# Patient Record
Sex: MALE | URBAN - METROPOLITAN AREA
[De-identification: names, ages, dates, MRNs, and addresses within clinical notes are randomized per-mention and may not be internally consistent; named-entity substitution may affect disease eponyms.]

---

## 2023-11-09 ENCOUNTER — TELEPHONE ENCOUNTER (OUTPATIENT)
Dept: URBAN - METROPOLITAN AREA CLINIC 88 | Facility: CLINIC | Age: 28
End: 2023-11-09

## 2023-11-16 ENCOUNTER — LAB OUTSIDE AN ENCOUNTER (OUTPATIENT)
Dept: URBAN - METROPOLITAN AREA CLINIC 88 | Facility: CLINIC | Age: 28
End: 2023-11-16

## 2023-11-16 ENCOUNTER — OFFICE VISIT (OUTPATIENT)
Dept: URBAN - METROPOLITAN AREA CLINIC 88 | Facility: CLINIC | Age: 28
End: 2023-11-16
Payer: COMMERCIAL

## 2023-11-16 VITALS
TEMPERATURE: 97 F | OXYGEN SATURATION: 99 % | DIASTOLIC BLOOD PRESSURE: 71 MMHG | HEIGHT: 68 IN | SYSTOLIC BLOOD PRESSURE: 127 MMHG | WEIGHT: 170.8 LBS | HEART RATE: 83 BPM | BODY MASS INDEX: 25.88 KG/M2

## 2023-11-16 DIAGNOSIS — R14.0 BLOATING: ICD-10-CM

## 2023-11-16 DIAGNOSIS — R19.8 ALTERNATING CONSTIPATION AND DIARRHEA: ICD-10-CM

## 2023-11-16 DIAGNOSIS — R10.30 LOWER ABDOMINAL PAIN: ICD-10-CM

## 2023-11-16 DIAGNOSIS — Z86.59 HISTORY OF ANXIETY: ICD-10-CM

## 2023-11-16 DIAGNOSIS — K21.9 GASTROESOPHAGEAL REFLUX DISEASE, UNSPECIFIED WHETHER ESOPHAGITIS PRESENT: ICD-10-CM

## 2023-11-16 PROBLEM — 235595009: Status: ACTIVE | Noted: 2023-11-16

## 2023-11-16 PROBLEM — 161470009: Status: ACTIVE | Noted: 2023-11-16

## 2023-11-16 PROCEDURE — 99204 OFFICE O/P NEW MOD 45 MIN: CPT | Performed by: NURSE PRACTITIONER

## 2023-11-16 RX ORDER — VORTIOXETINE 20 MG/1
1 TABLET TABLET, FILM COATED ORAL ONCE A DAY
Status: ACTIVE | COMMUNITY

## 2023-11-16 RX ORDER — FINASTERIDE 1 MG/1
1 TABLET TABLET, FILM COATED ORAL ONCE A DAY
Status: ACTIVE | COMMUNITY

## 2023-11-16 RX ORDER — BUPROPION HYDROCHLORIDE 300 MG/1
1 TABLET IN THE MORNING TABLET, EXTENDED RELEASE ORAL ONCE A DAY
Status: ACTIVE | COMMUNITY

## 2023-11-16 RX ORDER — METHYLPHENIDATE 20 MG/1
1 CAPSULE IN THE MORNING CAPSULE, EXTENDED RELEASE ORAL ONCE A DAY
Status: ACTIVE | COMMUNITY

## 2023-11-16 RX ORDER — FAMOTIDINE 40 MG/1
1 TABLET AT BEDTIME TABLET, FILM COATED ORAL ONCE A DAY
Qty: 90 TABLET | Refills: 0

## 2023-11-16 RX ORDER — GABAPENTIN 300 MG/1
1 CAPSULE CAPSULE ORAL ONCE A DAY
Status: ACTIVE | COMMUNITY

## 2023-11-16 RX ORDER — BACLOFEN 10 MG/1
1 TABLET AS NEEDED TABLET ORAL TWICE A DAY
Status: ACTIVE | COMMUNITY

## 2023-11-16 RX ORDER — OMEPRAZOLE 40 MG/1
1 CAPSULE 30 MINUTES BEFORE MORNING MEAL CAPSULE, DELAYED RELEASE ORAL ONCE A DAY
Status: ACTIVE | COMMUNITY

## 2023-11-16 RX ORDER — FLUTICASONE PROPIONATE 93 UG/1
2 SPRAYS (1 SPRAY IN EACH NOSTRIL) SPRAY, METERED NASAL TWICE A DAY
Status: ACTIVE | COMMUNITY

## 2023-11-16 RX ORDER — FAMOTIDINE 40 MG/1
1 TABLET AT BEDTIME TABLET, FILM COATED ORAL ONCE A DAY
Status: ACTIVE | COMMUNITY

## 2023-11-16 RX ORDER — OMEPRAZOLE 40 MG/1
TAKE 1 CAPSULE CAPSULE, DELAYED RELEASE ORAL ONCE A DAY
Qty: 90 | Refills: 1

## 2023-11-16 RX ORDER — DICYCLOMINE HYDROCHLORIDE 20 MG/1
1 TABLET TABLET ORAL THREE TIMES A DAY
Status: ACTIVE | COMMUNITY

## 2023-11-16 NOTE — HPI-TODAY'S VISIT:
Patient presents today to mi a colonoscopy. Developed several GI issues after Covid in May 2021 that has included increase in reflux, abdominal pain and varying stool consistencies.  Was evaluated by gastroenterologist while living in in [place] who recommended colonoscopy be perofrmed.  States moved back to [place] before work-up could be complete.  Voices a long hx of reflux and alternating bowel habits.   Experiences lower abdominal pain (left usually more than right) and lower back pain.  Describes his pain as being sense of bloating/fullness to a dull, ache occuring intermittently.  Feels his pain increases with certain foods (diary, ripe bananas, garlic, onions, etc).  Has been adhering to Low FODMAP diet for several years with improvement in abdominal discomfort voiced with use.  Dicyclomine has become less effective (10-20mg/dose) over the years.   Stool consistency varies from types 4-5 or 5-6 on [place] Stool Scale.  Voices hx of constipation during his childhood.   Typically defecation occurs multiple times a day (up 3-4 times) but may not achieve a complete sense of evacuation of stool.  Over the years, has tried Miralax, Benefiber.  Voices traumatic experiences with defecation as a child that he feels has contributed to his alternating bowel habits.  Feels his abdominal discomfort improves with defecation.   Denies signs of GI blood loss, weight loss or fever/chills. Has a hx of anxiety and is currently controlled with current regimen.   Last EGD was a year ago in [place], [place] (Dr. DELON Soto) with reflux voiced.  Currently on omeprazole and famotidine with adequate control symptoms. Colonoscopy was performed 2 years ago in [facility] with poor prep voiced.

## 2023-11-17 ENCOUNTER — OFFICE VISIT (OUTPATIENT)
Dept: URBAN - METROPOLITAN AREA CLINIC 88 | Facility: CLINIC | Age: 28
End: 2023-11-17

## 2023-11-20 ENCOUNTER — TELEPHONE ENCOUNTER (OUTPATIENT)
Dept: URBAN - METROPOLITAN AREA CLINIC 88 | Facility: CLINIC | Age: 28
End: 2023-11-20

## 2023-11-22 ENCOUNTER — OFFICE VISIT (OUTPATIENT)
Dept: URBAN - METROPOLITAN AREA SURGERY CENTER 24 | Facility: SURGERY CENTER | Age: 28
End: 2023-11-22

## 2023-11-22 ENCOUNTER — CLAIMS CREATED FROM THE CLAIM WINDOW (OUTPATIENT)
Dept: URBAN - METROPOLITAN AREA SURGERY CENTER 24 | Facility: SURGERY CENTER | Age: 28
End: 2023-11-22
Payer: COMMERCIAL

## 2023-11-22 DIAGNOSIS — R10.30 LOWER ABDOMINAL PAIN: ICD-10-CM

## 2023-11-22 DIAGNOSIS — R19.4 ALTERATION IN BOWEL ELIMINATION: ICD-10-CM

## 2023-11-22 DIAGNOSIS — R19.4 CHANGE IN BOWEL HABITS: ICD-10-CM

## 2023-11-22 PROCEDURE — 00811 ANES LWR INTST NDSC NOS: CPT | Performed by: NURSE ANESTHETIST, CERTIFIED REGISTERED

## 2023-11-22 PROCEDURE — G8907 PT DOC NO EVENTS ON DISCHARG: HCPCS | Performed by: INTERNAL MEDICINE

## 2023-11-22 PROCEDURE — 45378 DIAGNOSTIC COLONOSCOPY: CPT | Performed by: INTERNAL MEDICINE

## 2023-11-22 RX ORDER — BACLOFEN 10 MG/1
1 TABLET AS NEEDED TABLET ORAL TWICE A DAY
Status: ACTIVE | COMMUNITY

## 2023-11-22 RX ORDER — DICYCLOMINE HYDROCHLORIDE 20 MG/1
1 TABLET TABLET ORAL THREE TIMES A DAY
Status: ACTIVE | COMMUNITY

## 2023-11-22 RX ORDER — GABAPENTIN 300 MG/1
1 CAPSULE CAPSULE ORAL ONCE A DAY
Status: ACTIVE | COMMUNITY

## 2023-11-22 RX ORDER — FAMOTIDINE 40 MG/1
1 TABLET AT BEDTIME TABLET, FILM COATED ORAL ONCE A DAY
Qty: 90 TABLET | Refills: 0 | Status: ACTIVE | COMMUNITY

## 2023-11-22 RX ORDER — OMEPRAZOLE 40 MG/1
TAKE 1 CAPSULE CAPSULE, DELAYED RELEASE ORAL ONCE A DAY
Qty: 90 | Refills: 1 | Status: ACTIVE | COMMUNITY

## 2023-11-22 RX ORDER — FLUTICASONE PROPIONATE 93 UG/1
2 SPRAYS (1 SPRAY IN EACH NOSTRIL) SPRAY, METERED NASAL TWICE A DAY
Status: ACTIVE | COMMUNITY

## 2023-11-22 RX ORDER — FINASTERIDE 1 MG/1
1 TABLET TABLET, FILM COATED ORAL ONCE A DAY
Status: ACTIVE | COMMUNITY

## 2023-11-22 RX ORDER — BUPROPION HYDROCHLORIDE 300 MG/1
1 TABLET IN THE MORNING TABLET, EXTENDED RELEASE ORAL ONCE A DAY
Status: ACTIVE | COMMUNITY

## 2023-11-22 RX ORDER — VORTIOXETINE 20 MG/1
1 TABLET TABLET, FILM COATED ORAL ONCE A DAY
Status: ACTIVE | COMMUNITY

## 2023-11-22 RX ORDER — METHYLPHENIDATE 20 MG/1
1 CAPSULE IN THE MORNING CAPSULE, EXTENDED RELEASE ORAL ONCE A DAY
Status: ACTIVE | COMMUNITY

## 2023-12-18 PROBLEM — 440544005: Status: ACTIVE | Noted: 2023-12-18

## 2023-12-19 ENCOUNTER — OFFICE VISIT (OUTPATIENT)
Dept: URBAN - METROPOLITAN AREA CLINIC 88 | Facility: CLINIC | Age: 28
End: 2023-12-19
Payer: COMMERCIAL

## 2023-12-19 VITALS
HEART RATE: 102 BPM | DIASTOLIC BLOOD PRESSURE: 81 MMHG | WEIGHT: 165 LBS | SYSTOLIC BLOOD PRESSURE: 128 MMHG | BODY MASS INDEX: 25.01 KG/M2 | HEIGHT: 68 IN | OXYGEN SATURATION: 100 % | TEMPERATURE: 97.8 F

## 2023-12-19 DIAGNOSIS — K21.9 GASTROESOPHAGEAL REFLUX DISEASE, UNSPECIFIED WHETHER ESOPHAGITIS PRESENT: ICD-10-CM

## 2023-12-19 DIAGNOSIS — Z86.59 HISTORY OF ANXIETY: ICD-10-CM

## 2023-12-19 DIAGNOSIS — K58.2 MIXED IRRITABLE BOWEL SYNDROME: ICD-10-CM

## 2023-12-19 PROCEDURE — 99214 OFFICE O/P EST MOD 30 MIN: CPT | Performed by: NURSE PRACTITIONER

## 2023-12-19 RX ORDER — DICYCLOMINE HYDROCHLORIDE 20 MG/1
1 TABLET TABLET ORAL THREE TIMES A DAY
Status: ACTIVE | COMMUNITY

## 2023-12-19 RX ORDER — GABAPENTIN 300 MG/1
1 CAPSULE CAPSULE ORAL ONCE A DAY
Status: ACTIVE | COMMUNITY

## 2023-12-19 RX ORDER — FLUTICASONE PROPIONATE 93 UG/1
2 SPRAYS (1 SPRAY IN EACH NOSTRIL) SPRAY, METERED NASAL TWICE A DAY
Status: ACTIVE | COMMUNITY

## 2023-12-19 RX ORDER — FINASTERIDE 1 MG/1
1 TABLET TABLET, FILM COATED ORAL ONCE A DAY
Status: ACTIVE | COMMUNITY

## 2023-12-19 RX ORDER — METHYLPHENIDATE 20 MG/1
1 CAPSULE IN THE MORNING CAPSULE, EXTENDED RELEASE ORAL ONCE A DAY
Status: ACTIVE | COMMUNITY

## 2023-12-19 RX ORDER — POLYETHYLENE GLYCOL 3350 17 G/DOSE
MIX ONE SCOOP IN AT LEAST 8 OZ OF WATER, TEA, COFFEE, JUICE POWDER (GRAM) ORAL
Qty: 510 GRAMS | Refills: 5 | OUTPATIENT
Start: 2023-12-19 | End: 2024-06-16

## 2023-12-19 RX ORDER — FAMOTIDINE 40 MG/1
1 TABLET AT BEDTIME TABLET, FILM COATED ORAL ONCE A DAY
Qty: 90 TABLET | Refills: 0

## 2023-12-19 RX ORDER — BACLOFEN 10 MG/1
1 TABLET AS NEEDED TABLET ORAL TWICE A DAY
Status: ACTIVE | COMMUNITY

## 2023-12-19 RX ORDER — OMEPRAZOLE 40 MG/1
TAKE 1 CAPSULE CAPSULE, DELAYED RELEASE ORAL ONCE A DAY
Qty: 90 | Refills: 1 | Status: ACTIVE | COMMUNITY

## 2023-12-19 RX ORDER — BUPROPION HYDROCHLORIDE 300 MG/1
1 TABLET IN THE MORNING TABLET, EXTENDED RELEASE ORAL ONCE A DAY
Status: ACTIVE | COMMUNITY

## 2023-12-19 RX ORDER — OMEPRAZOLE 40 MG/1
TAKE 1 CAPSULE CAPSULE, DELAYED RELEASE ORAL ONCE A DAY
Qty: 90 | Refills: 1

## 2023-12-19 RX ORDER — VORTIOXETINE 20 MG/1
1 TABLET TABLET, FILM COATED ORAL ONCE A DAY
Status: ACTIVE | COMMUNITY

## 2023-12-19 RX ORDER — FAMOTIDINE 40 MG/1
1 TABLET AT BEDTIME TABLET, FILM COATED ORAL ONCE A DAY
Qty: 90 TABLET | Refills: 0 | Status: ACTIVE | COMMUNITY

## 2023-12-19 NOTE — HPI-TODAY'S VISIT:
Presents today for follow up after undergoing colonoscopy on 11/22/2023 for evaluation of c/o abdominal pain and alternating bowel pattern.  Colonoscopy was normal.  KUB on 11/16/2023 revealed moderate amount of gas and stool within rectal vault with rectum distended to 7 cm.  Continues to voice excessive gas/bloating, intestinal spasms (especially with anxiety) and alternating bowel habits.  Typically defecation occurs 3-5 times a day.  May not experience a complete sense of evacuation of stool   Currently on Miralax once a day with occasional fiber supplements.  Instructed to increase Miralax to 34 grams/day but is hesistant to increase dose.  Remains on Low FODMAP diet with minimal improvement in complaints.      Last EGD was a year ago in [place], [place] (Dr. DELON Soto) with reflux voiced.  Currently on omeprazole and famotidine with adequate control symptoms. Colonoscopy was performed 2 years ago in [facility] with poor prep voiced.

## 2023-12-19 NOTE — HPI-OTHER HISTORIES
-------------------------------------------------------------------- Last office note 11/16/2023: Patient presents today to mi a colonoscopy. Developed several GI issues after Covid in May 2021 that has included increase in reflux, abdominal pain and varying stool consistencies.  Was evaluated by gastroenterologist while living in in [place] who recommended colonoscopy be perofrmed.  States moved back to [place] before work-up could be complete.  Voices a long hx of reflux and alternating bowel habits.   Experiences lower abdominal pain (left usually more than right) and lower back pain.  Describes his pain as being sense of bloating/fullness to a dull, ache occuring intermittently.  Feels his pain increases with certain foods (diary, ripe bananas, garlic, onions, etc).  Has been adhering to Low FODMAP diet for several years with improvement in abdominal discomfort voiced with use.  Dicyclomine has become less effective (10-20mg/dose) over the years.   Stool consistency varies from types 4-5 or 5-6 on [place] Stool Scale.  Voices hx of constipation during his childhood.   Typically defecation occurs multiple times a day (up 3-4 times) but may not achieve a complete sense of evacuation of stool.  Over the years, has tried Miralax, Benefiber.  Voices traumatic experiences with defecation as a child that he feels has contributed to his alternating bowel habits.  Feels his abdominal discomfort improves with defecation.   Denies signs of GI blood loss, weight loss or fever/chills. Has a hx of anxiety and is currently controlled with current regimen.   Last EGD was a year ago in [place], [place] (Dr. DELON Soto) with reflux voiced.  Currently on omeprazole and famotidine with adequate control symptoms. Colonoscopy was performed 2 years ago in [facility] with poor prep voiced.

## 2024-01-30 ENCOUNTER — OFFICE VISIT (OUTPATIENT)
Dept: URBAN - METROPOLITAN AREA CLINIC 88 | Facility: CLINIC | Age: 29
End: 2024-01-30
Payer: COMMERCIAL

## 2024-01-30 VITALS
BODY MASS INDEX: 24.61 KG/M2 | SYSTOLIC BLOOD PRESSURE: 124 MMHG | TEMPERATURE: 97.2 F | DIASTOLIC BLOOD PRESSURE: 74 MMHG | WEIGHT: 162.4 LBS | HEIGHT: 68 IN | HEART RATE: 72 BPM | OXYGEN SATURATION: 99 %

## 2024-01-30 DIAGNOSIS — K58.2 MIXED IRRITABLE BOWEL SYNDROME: ICD-10-CM

## 2024-01-30 DIAGNOSIS — K21.9 GASTROESOPHAGEAL REFLUX DISEASE, UNSPECIFIED WHETHER ESOPHAGITIS PRESENT: ICD-10-CM

## 2024-01-30 DIAGNOSIS — Z86.59 HISTORY OF ANXIETY: ICD-10-CM

## 2024-01-30 PROCEDURE — 99214 OFFICE O/P EST MOD 30 MIN: CPT | Performed by: NURSE PRACTITIONER

## 2024-01-30 RX ORDER — FAMOTIDINE 40 MG/1
1 TABLET AT BEDTIME TABLET, FILM COATED ORAL ONCE A DAY
Qty: 90 TABLET | Refills: 0

## 2024-01-30 RX ORDER — BUPROPION HYDROCHLORIDE 300 MG/1
1 TABLET IN THE MORNING TABLET, EXTENDED RELEASE ORAL ONCE A DAY
Status: ACTIVE | COMMUNITY

## 2024-01-30 RX ORDER — VORTIOXETINE 20 MG/1
1 TABLET TABLET, FILM COATED ORAL ONCE A DAY
Status: ACTIVE | COMMUNITY

## 2024-01-30 RX ORDER — FAMOTIDINE 40 MG/1
1 TABLET AT BEDTIME TABLET, FILM COATED ORAL ONCE A DAY
Qty: 90 TABLET | Refills: 0 | Status: ACTIVE | COMMUNITY

## 2024-01-30 RX ORDER — OMEPRAZOLE 40 MG/1
TAKE 1 CAPSULE CAPSULE, DELAYED RELEASE ORAL ONCE A DAY
Qty: 90 | Refills: 1 | Status: ACTIVE | COMMUNITY

## 2024-01-30 RX ORDER — OMEPRAZOLE 40 MG/1
TAKE 1 CAPSULE CAPSULE, DELAYED RELEASE ORAL ONCE A DAY
Qty: 90 | Refills: 1

## 2024-01-30 RX ORDER — POLYETHYLENE GLYCOL 3350 17 G/DOSE
MIX ONE SCOOP IN AT LEAST 8 OZ OF WATER, TEA, COFFEE, JUICE POWDER (GRAM) ORAL
Qty: 510 GRAMS | Refills: 5 | OUTPATIENT

## 2024-01-30 RX ORDER — GABAPENTIN 300 MG/1
1 CAPSULE CAPSULE ORAL ONCE A DAY
Status: ACTIVE | COMMUNITY

## 2024-01-30 RX ORDER — METHYLPHENIDATE 20 MG/1
1 CAPSULE IN THE MORNING CAPSULE, EXTENDED RELEASE ORAL ONCE A DAY
Status: ACTIVE | COMMUNITY

## 2024-01-30 RX ORDER — POLYETHYLENE GLYCOL 3350 17 G/DOSE
MIX ONE SCOOP IN AT LEAST 8 OZ OF WATER, TEA, COFFEE, JUICE POWDER (GRAM) ORAL
Qty: 510 GRAMS | Refills: 5 | Status: ACTIVE | COMMUNITY
Start: 2023-12-19 | End: 2024-06-16

## 2024-01-30 RX ORDER — FINASTERIDE 1 MG/1
1 TABLET TABLET, FILM COATED ORAL ONCE A DAY
Status: ACTIVE | COMMUNITY

## 2024-01-30 RX ORDER — BACLOFEN 10 MG/1
1 TABLET AS NEEDED TABLET ORAL TWICE A DAY
Status: ACTIVE | COMMUNITY

## 2024-01-30 RX ORDER — DICYCLOMINE HYDROCHLORIDE 20 MG/1
1 TABLET TABLET ORAL THREE TIMES A DAY
Status: ACTIVE | COMMUNITY

## 2024-01-30 RX ORDER — FLUTICASONE PROPIONATE 93 UG/1
2 SPRAYS (1 SPRAY IN EACH NOSTRIL) SPRAY, METERED NASAL TWICE A DAY
Status: ACTIVE | COMMUNITY

## 2024-01-30 NOTE — HPI-OTHER HISTORIES
-------------------------------------------------------------------- Last office note 12/19/2023: Presents today for follow up after undergoing colonoscopy on 11/22/2023 for evaluation of c/o abdominal pain and alternating bowel pattern. Colonoscopy was normal. KUB on 11/16/2023 revealed moderate amount of gas and stool within rectal vault with rectum distended to 7 cm.  Continues to voice excessive gas/bloating, intestinal spasms (especially with anxiety) and alternating bowel habits.  Typically defecation occurs 3-5 times a day.  May not experience a complete sense of evacuation of stool   Currently on Miralax once a day with occasional fiber supplements.  Instructed to increase Miralax to 34 grams/day but is hesistant to increase dose.  Remains on Low FODMAP diet with minimal improvement in complaints.      Last EGD was a year ago in [place], [place] (Dr. DELON Soto) with reflux voiced.  Currently on omeprazole and famotidine with adequate control symptoms. Colonoscopy was performed 2 years ago in [place] [place]. [facility]"" with poor prep voiced.

## 2024-01-30 NOTE — HPI-TODAY'S VISIT:
Patient with hx of IBS presetning for follow up.  Remains on Mirlaax daily to manage constipation.  Fails to experience a complete sense of evacuation of stool.  Continues to voice excessive gas/bloating, intestinal spasms (especially with anxiety) and alternating bowel habits.  Typically defecation occurs 3-5 times a day. Remains on Low FODMAP diet.

## 2024-02-07 PROBLEM — 440630006: Status: ACTIVE | Noted: 2024-02-07

## 2024-04-30 ENCOUNTER — OV EP (OUTPATIENT)
Dept: URBAN - METROPOLITAN AREA CLINIC 88 | Facility: CLINIC | Age: 29
End: 2024-04-30

## 2024-04-30 RX ORDER — BUPROPION HYDROCHLORIDE 300 MG/1
1 TABLET IN THE MORNING TABLET, EXTENDED RELEASE ORAL ONCE A DAY
Status: ACTIVE | COMMUNITY

## 2024-04-30 RX ORDER — PLECANATIDE 3 MG/1
1 TABLET TABLET ORAL
Qty: 90 | Refills: 3 | Status: ACTIVE | COMMUNITY
Start: 2024-02-07 | End: 2025-02-01

## 2024-04-30 RX ORDER — FLUTICASONE PROPIONATE 93 UG/1
2 SPRAYS (1 SPRAY IN EACH NOSTRIL) SPRAY, METERED NASAL TWICE A DAY
Status: ACTIVE | COMMUNITY

## 2024-04-30 RX ORDER — METHYLPHENIDATE 20 MG/1
1 CAPSULE IN THE MORNING CAPSULE, EXTENDED RELEASE ORAL ONCE A DAY
Status: ACTIVE | COMMUNITY

## 2024-04-30 RX ORDER — FINASTERIDE 1 MG/1
1 TABLET TABLET, FILM COATED ORAL ONCE A DAY
Status: ACTIVE | COMMUNITY

## 2024-04-30 RX ORDER — DICYCLOMINE HYDROCHLORIDE 20 MG/1
1 TABLET TABLET ORAL THREE TIMES A DAY
Status: ACTIVE | COMMUNITY

## 2024-04-30 RX ORDER — BACLOFEN 10 MG/1
1 TABLET AS NEEDED TABLET ORAL TWICE A DAY
Status: ACTIVE | COMMUNITY

## 2024-04-30 RX ORDER — FAMOTIDINE 40 MG/1
1 TABLET AT BEDTIME TABLET, FILM COATED ORAL ONCE A DAY
Qty: 90 TABLET | Refills: 0 | Status: ACTIVE | COMMUNITY

## 2024-04-30 RX ORDER — GABAPENTIN 300 MG/1
1 CAPSULE CAPSULE ORAL ONCE A DAY
Status: ACTIVE | COMMUNITY

## 2024-04-30 RX ORDER — POLYETHYLENE GLYCOL 3350 17 G/DOSE
MIX ONE SCOOP IN AT LEAST 8 OZ OF WATER, TEA, COFFEE, JUICE POWDER (GRAM) ORAL
Qty: 510 GRAMS | Refills: 5 | Status: ACTIVE | COMMUNITY

## 2024-04-30 RX ORDER — OMEPRAZOLE 40 MG/1
TAKE 1 CAPSULE CAPSULE, DELAYED RELEASE ORAL ONCE A DAY
Qty: 90 | Refills: 1 | Status: ACTIVE | COMMUNITY

## 2024-04-30 RX ORDER — VORTIOXETINE 20 MG/1
1 TABLET TABLET, FILM COATED ORAL ONCE A DAY
Status: ACTIVE | COMMUNITY

## 2024-05-15 ENCOUNTER — LAB OUTSIDE AN ENCOUNTER (OUTPATIENT)
Dept: URBAN - METROPOLITAN AREA CLINIC 88 | Facility: CLINIC | Age: 29
End: 2024-05-15

## 2024-05-15 ENCOUNTER — DASHBOARD ENCOUNTERS (OUTPATIENT)
Age: 29
End: 2024-05-15

## 2024-05-15 ENCOUNTER — OFFICE VISIT (OUTPATIENT)
Dept: URBAN - METROPOLITAN AREA CLINIC 88 | Facility: CLINIC | Age: 29
End: 2024-05-15
Payer: COMMERCIAL

## 2024-05-15 VITALS
BODY MASS INDEX: 24.43 KG/M2 | DIASTOLIC BLOOD PRESSURE: 73 MMHG | SYSTOLIC BLOOD PRESSURE: 117 MMHG | OXYGEN SATURATION: 100 % | WEIGHT: 161.2 LBS | TEMPERATURE: 98 F | HEART RATE: 88 BPM | HEIGHT: 68 IN

## 2024-05-15 DIAGNOSIS — R14.0 POSTPRANDIAL BLOATING: ICD-10-CM

## 2024-05-15 DIAGNOSIS — K21.9 GASTROESOPHAGEAL REFLUX DISEASE, UNSPECIFIED WHETHER ESOPHAGITIS PRESENT: ICD-10-CM

## 2024-05-15 DIAGNOSIS — Z86.59 HISTORY OF ANXIETY: ICD-10-CM

## 2024-05-15 DIAGNOSIS — K58.1 IRRITABLE BOWEL SYNDROME WITH CONSTIPATION: ICD-10-CM

## 2024-05-15 PROCEDURE — 99213 OFFICE O/P EST LOW 20 MIN: CPT | Performed by: NURSE PRACTITIONER

## 2024-05-15 RX ORDER — GABAPENTIN 300 MG/1
1 CAPSULE CAPSULE ORAL ONCE A DAY
Status: ACTIVE | COMMUNITY

## 2024-05-15 RX ORDER — PLECANATIDE 3 MG/1
1 TABLET TABLET ORAL
OUTPATIENT
Start: 2024-02-07

## 2024-05-15 RX ORDER — DICYCLOMINE HYDROCHLORIDE 20 MG/1
1 TABLET TABLET ORAL
Qty: 60 | Refills: 2

## 2024-05-15 RX ORDER — FAMOTIDINE 40 MG/1
1 TABLET AT BEDTIME TABLET, FILM COATED ORAL ONCE A DAY
Qty: 90 TABLET | Refills: 0 | Status: DISCONTINUED | COMMUNITY

## 2024-05-15 RX ORDER — METHYLPHENIDATE 20 MG/1
1 CAPSULE IN THE MORNING CAPSULE, EXTENDED RELEASE ORAL ONCE A DAY
Status: ACTIVE | COMMUNITY

## 2024-05-15 RX ORDER — POLYETHYLENE GLYCOL 3350 17 G/DOSE
MIX ONE SCOOP IN AT LEAST 8 OZ OF WATER, TEA, COFFEE, JUICE POWDER (GRAM) ORAL
Qty: 510 GRAMS | Refills: 5 | Status: DISCONTINUED | COMMUNITY

## 2024-05-15 RX ORDER — OMEPRAZOLE 40 MG/1
TAKE 1 CAPSULE CAPSULE, DELAYED RELEASE ORAL ONCE A DAY
Qty: 90 | Refills: 1 | Status: ACTIVE | COMMUNITY

## 2024-05-15 RX ORDER — FLUTICASONE PROPIONATE 93 UG/1
2 SPRAYS (1 SPRAY IN EACH NOSTRIL) SPRAY, METERED NASAL TWICE A DAY
Status: ACTIVE | COMMUNITY

## 2024-05-15 RX ORDER — FINASTERIDE 1 MG/1
1 TABLET TABLET, FILM COATED ORAL ONCE A DAY
Status: ACTIVE | COMMUNITY

## 2024-05-15 RX ORDER — VORTIOXETINE 20 MG/1
1 TABLET TABLET, FILM COATED ORAL ONCE A DAY
Status: ACTIVE | COMMUNITY

## 2024-05-15 RX ORDER — DICYCLOMINE HYDROCHLORIDE 20 MG/1
1 TABLET TABLET ORAL THREE TIMES A DAY
Status: ACTIVE | COMMUNITY

## 2024-05-15 RX ORDER — OMEPRAZOLE 40 MG/1
TAKE 1 CAPSULE CAPSULE, DELAYED RELEASE ORAL ONCE A DAY
Qty: 90 | Refills: 1

## 2024-05-15 RX ORDER — BACLOFEN 10 MG/1
1 TABLET AS NEEDED TABLET ORAL TWICE A DAY
Status: ACTIVE | COMMUNITY

## 2024-05-15 RX ORDER — PLECANATIDE 3 MG/1
1 TABLET TABLET ORAL
Qty: 90 | Refills: 3 | Status: ACTIVE | COMMUNITY
Start: 2024-02-07 | End: 2025-02-01

## 2024-05-15 RX ORDER — BUPROPION HYDROCHLORIDE 300 MG/1
1 TABLET IN THE MORNING TABLET, EXTENDED RELEASE ORAL ONCE A DAY
Status: ACTIVE | COMMUNITY

## 2024-06-11 ENCOUNTER — TELEPHONE ENCOUNTER (OUTPATIENT)
Dept: URBAN - METROPOLITAN AREA CLINIC 70 | Facility: CLINIC | Age: 29
End: 2024-06-11

## 2024-06-11 RX ORDER — HYDROCORTISONE ACETATE 25 MG/1
1 SUPPOSITORY SUPPOSITORY RECTAL
Qty: 14 | Refills: 1 | OUTPATIENT
Start: 2024-06-11 | End: 2024-07-09

## 2024-08-12 ENCOUNTER — OFFICE VISIT (OUTPATIENT)
Dept: URBAN - METROPOLITAN AREA CLINIC 88 | Facility: CLINIC | Age: 29
End: 2024-08-12
Payer: COMMERCIAL

## 2024-08-12 VITALS
BODY MASS INDEX: 25.52 KG/M2 | SYSTOLIC BLOOD PRESSURE: 106 MMHG | WEIGHT: 168.4 LBS | DIASTOLIC BLOOD PRESSURE: 68 MMHG | OXYGEN SATURATION: 100 % | TEMPERATURE: 97.6 F | HEART RATE: 81 BPM | HEIGHT: 68 IN

## 2024-08-12 DIAGNOSIS — K58.1 IRRITABLE BOWEL SYNDROME WITH CONSTIPATION: ICD-10-CM

## 2024-08-12 DIAGNOSIS — K21.9 GASTROESOPHAGEAL REFLUX DISEASE, UNSPECIFIED WHETHER ESOPHAGITIS PRESENT: ICD-10-CM

## 2024-08-12 DIAGNOSIS — R14.0 POSTPRANDIAL BLOATING: ICD-10-CM

## 2024-08-12 DIAGNOSIS — Z86.59 HISTORY OF ANXIETY: ICD-10-CM

## 2024-08-12 PROCEDURE — 99213 OFFICE O/P EST LOW 20 MIN: CPT | Performed by: NURSE PRACTITIONER

## 2024-08-12 RX ORDER — VORTIOXETINE 20 MG/1
1 TABLET TABLET, FILM COATED ORAL ONCE A DAY
Status: ACTIVE | COMMUNITY

## 2024-08-12 RX ORDER — METHYLPHENIDATE 10 MG/1
1 CAPSULE IN THE MORNING CAPSULE, EXTENDED RELEASE ORAL ONCE A DAY
Refills: 0 | Status: ACTIVE | COMMUNITY

## 2024-08-12 RX ORDER — CHLORDIAZEPOXIDE HYDROCHLORIDE AND CLIDINIUM BROMIDE 5; 2.5 MG/1; MG/1
1 CAPSULE BEFORE MEALS CAPSULE ORAL
Qty: 60 | Refills: 1 | OUTPATIENT
Start: 2024-08-12 | End: 2024-10-11

## 2024-08-12 RX ORDER — OMEPRAZOLE 40 MG/1
TAKE 1 CAPSULE CAPSULE, DELAYED RELEASE ORAL ONCE A DAY
Qty: 90 | Refills: 1

## 2024-08-12 RX ORDER — PLECANATIDE 3 MG/1
1 TABLET TABLET ORAL
OUTPATIENT

## 2024-08-12 RX ORDER — FLUTICASONE PROPIONATE 93 UG/1
2 SPRAYS (1 SPRAY IN EACH NOSTRIL) SPRAY, METERED NASAL TWICE A DAY
Status: ACTIVE | COMMUNITY

## 2024-08-12 RX ORDER — DICYCLOMINE HYDROCHLORIDE 20 MG/1
1 TABLET TABLET ORAL
Qty: 60 | Refills: 2 | Status: ACTIVE | COMMUNITY

## 2024-08-12 RX ORDER — BUPROPION HYDROCHLORIDE 300 MG/1
1 TABLET IN THE MORNING TABLET, EXTENDED RELEASE ORAL ONCE A DAY
Status: ACTIVE | COMMUNITY

## 2024-08-12 RX ORDER — GABAPENTIN 300 MG/1
1 CAPSULE CAPSULE ORAL ONCE A DAY
Status: ACTIVE | COMMUNITY

## 2024-08-12 RX ORDER — BACLOFEN 10 MG/1
1 TABLET AS NEEDED TABLET ORAL TWICE A DAY
Status: ACTIVE | COMMUNITY

## 2024-08-12 RX ORDER — OMEPRAZOLE 40 MG/1
TAKE 1 CAPSULE CAPSULE, DELAYED RELEASE ORAL ONCE A DAY
Qty: 90 | Refills: 1 | Status: ACTIVE | COMMUNITY

## 2024-08-12 RX ORDER — PLECANATIDE 3 MG/1
1 TABLET TABLET ORAL
Status: ACTIVE | COMMUNITY
Start: 2024-02-07

## 2024-08-12 RX ORDER — FINASTERIDE 1 MG/1
1 TABLET TABLET, FILM COATED ORAL ONCE A DAY
Status: ACTIVE | COMMUNITY

## 2024-08-12 NOTE — HPI-OTHER HISTORIES
---------------------------------------------------------------- Office note 05/15/2024: Alisa presents todayfor follow up regarding IBS-C.  Currently on Trulance daily wiht defecation occurs daily.  Voices a complete sense of evacuation with use.  Since LOV, discontinued daily adherence to Low FODMAP.  Slowly starting resuming diary and other food.  Has noticed return in gas, bloating and belching.  As result, resumed Low FODMAP diet to evaluate complaints.

## 2024-08-12 NOTE — HPI-TODAY'S VISIT:
Presents today for follow up regarding IBS-C. Remains on Trulance 3mg to manage his constipation.   His main complaint is of abdominal pain and disocmfort.  Abdomianal pain occurs primarily is primarily to lower abdominal pain, typically prior to and with urge for defecation.  This pain improves after defecation occurs and the cycle repeats itself.  Dicyclomine has helped to lessen this pain.  Takes medication sparingly due to side effect of consitpation.   While on antibiotics (doxycylomine) and steroid treatment for sinus infection, his abdominal discomfort improved significantly.    Continues to adhere to Low FODMAP diet.  Adheres to high fiber diet.

## 2024-10-08 ENCOUNTER — TELEPHONE ENCOUNTER (OUTPATIENT)
Dept: URBAN - METROPOLITAN AREA CLINIC 70 | Facility: CLINIC | Age: 29
End: 2024-10-08

## 2024-10-08 RX ORDER — RIFAXIMIN 550 MG/1
1 TABLET TABLET ORAL THREE TIMES A DAY
Qty: 42 TABLET | Refills: 2 | OUTPATIENT
Start: 2024-10-09 | End: 2024-11-19

## 2024-10-10 ENCOUNTER — TELEPHONE ENCOUNTER (OUTPATIENT)
Dept: URBAN - METROPOLITAN AREA CLINIC 70 | Facility: CLINIC | Age: 29
End: 2024-10-10

## 2024-10-11 ENCOUNTER — TELEPHONE ENCOUNTER (OUTPATIENT)
Dept: URBAN - METROPOLITAN AREA CLINIC 88 | Facility: CLINIC | Age: 29
End: 2024-10-11

## 2024-10-17 ENCOUNTER — TELEPHONE ENCOUNTER (OUTPATIENT)
Dept: URBAN - METROPOLITAN AREA CLINIC 88 | Facility: CLINIC | Age: 29
End: 2024-10-17

## 2024-11-11 ENCOUNTER — OFFICE VISIT (OUTPATIENT)
Dept: URBAN - METROPOLITAN AREA CLINIC 88 | Facility: CLINIC | Age: 29
End: 2024-11-11

## 2024-11-11 RX ORDER — GABAPENTIN 300 MG/1
1 CAPSULE CAPSULE ORAL ONCE A DAY
Status: ACTIVE | COMMUNITY

## 2024-11-11 RX ORDER — OMEPRAZOLE 40 MG/1
TAKE 1 CAPSULE CAPSULE, DELAYED RELEASE ORAL ONCE A DAY
Qty: 90 | Refills: 1 | Status: ACTIVE | COMMUNITY

## 2024-11-11 RX ORDER — FINASTERIDE 1 MG/1
1 TABLET TABLET, FILM COATED ORAL ONCE A DAY
Status: ACTIVE | COMMUNITY

## 2024-11-11 RX ORDER — PLECANATIDE 3 MG/1
1 TABLET TABLET ORAL
Status: ACTIVE | COMMUNITY

## 2024-11-11 RX ORDER — FLUTICASONE PROPIONATE 93 UG/1
2 SPRAYS (1 SPRAY IN EACH NOSTRIL) SPRAY, METERED NASAL TWICE A DAY
Status: ACTIVE | COMMUNITY

## 2024-11-11 RX ORDER — VORTIOXETINE 20 MG/1
1 TABLET TABLET, FILM COATED ORAL ONCE A DAY
Status: ACTIVE | COMMUNITY

## 2024-11-11 RX ORDER — RIFAXIMIN 550 MG/1
1 TABLET TABLET ORAL THREE TIMES A DAY
Qty: 42 TABLET | Refills: 2 | Status: ACTIVE | COMMUNITY
Start: 2024-10-09 | End: 2024-11-19

## 2024-11-11 RX ORDER — BACLOFEN 10 MG/1
1 TABLET AS NEEDED TABLET ORAL TWICE A DAY
Status: ACTIVE | COMMUNITY

## 2024-11-11 RX ORDER — DICYCLOMINE HYDROCHLORIDE 20 MG/1
1 TABLET TABLET ORAL
Qty: 60 | Refills: 2 | Status: ACTIVE | COMMUNITY

## 2024-11-11 RX ORDER — BUPROPION HYDROCHLORIDE 300 MG/1
1 TABLET IN THE MORNING TABLET, EXTENDED RELEASE ORAL ONCE A DAY
Status: ACTIVE | COMMUNITY

## 2024-11-11 RX ORDER — METHYLPHENIDATE 10 MG/1
1 CAPSULE IN THE MORNING CAPSULE, EXTENDED RELEASE ORAL ONCE A DAY
Refills: 0 | Status: ACTIVE | COMMUNITY

## 2024-11-18 ENCOUNTER — OFFICE VISIT (OUTPATIENT)
Dept: URBAN - METROPOLITAN AREA CLINIC 88 | Facility: CLINIC | Age: 29
End: 2024-11-18
Payer: COMMERCIAL

## 2024-11-18 ENCOUNTER — LAB OUTSIDE AN ENCOUNTER (OUTPATIENT)
Dept: URBAN - METROPOLITAN AREA CLINIC 88 | Facility: CLINIC | Age: 29
End: 2024-11-18

## 2024-11-18 VITALS
SYSTOLIC BLOOD PRESSURE: 116 MMHG | OXYGEN SATURATION: 99 % | TEMPERATURE: 97.4 F | DIASTOLIC BLOOD PRESSURE: 76 MMHG | BODY MASS INDEX: 23.73 KG/M2 | WEIGHT: 156.6 LBS | HEART RATE: 102 BPM | HEIGHT: 68 IN

## 2024-11-18 DIAGNOSIS — K21.9 GASTROESOPHAGEAL REFLUX DISEASE, UNSPECIFIED WHETHER ESOPHAGITIS PRESENT: ICD-10-CM

## 2024-11-18 DIAGNOSIS — K58.1 IRRITABLE BOWEL SYNDROME WITH CONSTIPATION: ICD-10-CM

## 2024-11-18 DIAGNOSIS — R14.0 POSTPRANDIAL BLOATING: ICD-10-CM

## 2024-11-18 PROCEDURE — 99213 OFFICE O/P EST LOW 20 MIN: CPT | Performed by: NURSE PRACTITIONER

## 2024-11-18 RX ORDER — PLECANATIDE 3 MG/1
1 TABLET TABLET ORAL
Status: ACTIVE | COMMUNITY

## 2024-11-18 RX ORDER — OMEPRAZOLE 40 MG/1
TAKE 1 CAPSULE CAPSULE, DELAYED RELEASE ORAL ONCE A DAY
Qty: 90 | Refills: 1 | Status: ACTIVE | COMMUNITY

## 2024-11-18 RX ORDER — TIOTROPIUM BROMIDE INHALATION SPRAY 3.12 UG/1
SPRAY, METERED RESPIRATORY (INHALATION)
Qty: 12 GRAM | Status: ACTIVE | COMMUNITY

## 2024-11-18 RX ORDER — BACLOFEN 10 MG/1
1 TABLET AS NEEDED TABLET ORAL TWICE A DAY
Status: ACTIVE | COMMUNITY

## 2024-11-18 RX ORDER — FINASTERIDE 1 MG/1
1 TABLET TABLET, FILM COATED ORAL ONCE A DAY
Status: ACTIVE | COMMUNITY

## 2024-11-18 RX ORDER — GABAPENTIN 300 MG/1
1 CAPSULE CAPSULE ORAL ONCE A DAY
Status: ACTIVE | COMMUNITY

## 2024-11-18 RX ORDER — METHYLPHENIDATE 10 MG/1
1 CAPSULE IN THE MORNING CAPSULE, EXTENDED RELEASE ORAL ONCE A DAY
Refills: 0 | Status: ACTIVE | COMMUNITY

## 2024-11-18 RX ORDER — RIFAXIMIN 550 MG/1
1 TABLET TABLET ORAL THREE TIMES A DAY
Qty: 42 TABLET | Refills: 2 | Status: ACTIVE | COMMUNITY
Start: 2024-10-09 | End: 2024-11-19

## 2024-11-18 RX ORDER — DICYCLOMINE HYDROCHLORIDE 20 MG/1
1 TABLET TABLET ORAL
Qty: 60 | Refills: 2 | Status: ACTIVE | COMMUNITY

## 2024-11-18 RX ORDER — OMEPRAZOLE 40 MG/1
TAKE 1 CAPSULE CAPSULE, DELAYED RELEASE ORAL ONCE A DAY
Qty: 90 | Refills: 1

## 2024-11-18 RX ORDER — BUPROPION HYDROCHLORIDE 300 MG/1
1 TABLET IN THE MORNING TABLET, EXTENDED RELEASE ORAL ONCE A DAY
Status: ACTIVE | COMMUNITY

## 2024-11-18 RX ORDER — VORTIOXETINE 20 MG/1
1 TABLET TABLET, FILM COATED ORAL ONCE A DAY
Status: ACTIVE | COMMUNITY

## 2024-11-18 RX ORDER — PLECANATIDE 3 MG/1
1 TABLET TABLET ORAL
OUTPATIENT

## 2024-11-18 RX ORDER — IPRATROPIUM BROMIDE 0.5 MG/2.5ML
SOLUTION RESPIRATORY (INHALATION)
Qty: 300 MILLILITER | Status: ACTIVE | COMMUNITY

## 2024-11-18 RX ORDER — ALBUTEROL SULFATE 2.5 MG/3ML
3 ML AS NEEDED SOLUTION RESPIRATORY (INHALATION)
Status: ACTIVE | COMMUNITY

## 2024-11-18 RX ORDER — FLUTICASONE PROPIONATE 93 UG/1
2 SPRAYS (1 SPRAY IN EACH NOSTRIL) SPRAY, METERED NASAL TWICE A DAY
Status: ACTIVE | COMMUNITY

## 2024-11-18 NOTE — HPI-TODAY'S VISIT:
Presents today for follow up regarding IBS-C. Remains on Trulance 3mg to manage his constipation.  Due to c/o abdominal bloating, prescribed Xifaxan 550 mg.  Feels constipation increased after use of Xifaxan.  Stools are similar to types 2-3 with straining voiced. Admits to spending about 30 minutes on the toilet to experience a bowel movement.  Defecation is occurring every 1-2 days.  Experiences fecal seepage in this setting.   Admits to 2 rounds of antibiotics for chronic infection, which may be contributing to his c/o seepage of stool.  Still voices increase gas pains and abdominal discomfort.  Remains on Trulance 3 mg but still fails to experience a complete sense of evacuation of stool.   Admits to increased amount of anxiety and depression over the last several months.  He remains on the care of pyschologist and psychiatrist.

## 2024-11-18 NOTE — HPI-OTHER HISTORIES
- - - - - - - - - - - - - - - - - - - - - - - - - - - Office note 08/12/2024: Presents today for follow up regarding IBS-C. Remains on Trulance 3mg to manage his constipation.   His main complaint is of abdominal pain and disocmfort.  Abdomianal pain occurs primarily is primarily to lower abdominal pain, typically prior to and with urge for defecation.  This pain improves after defecation occurs and the cycle repeats itself.  Dicyclomine has helped to lessen this pain.  Takes medication sparingly due to side effect of consitpation.   While on antibiotics (doxycylomine) and steroid treatment for sinus infection, his abdominal discomfort improved significantly.    Continues to adhere to Low FODMAP diet.  Adheres to high fiber diet.

## 2025-02-12 ENCOUNTER — ERX REFILL RESPONSE (OUTPATIENT)
Dept: URBAN - METROPOLITAN AREA CLINIC 70 | Facility: CLINIC | Age: 30
End: 2025-02-12

## 2025-02-12 RX ORDER — PLECANATIDE 3 MG/1
1 TABLET TABLET ORAL
Qty: 90 TABLET | Refills: 3 | OUTPATIENT

## 2025-02-12 RX ORDER — PLECANATIDE 3 MG/1
1 TABLET TABLET ORAL
OUTPATIENT

## 2025-02-28 ENCOUNTER — OFFICE VISIT (OUTPATIENT)
Dept: URBAN - METROPOLITAN AREA CLINIC 88 | Facility: CLINIC | Age: 30
End: 2025-02-28

## 2025-02-28 VITALS — HEIGHT: 68 IN | WEIGHT: 156.6 LBS | BODY MASS INDEX: 23.73 KG/M2

## 2025-02-28 RX ORDER — PLECANATIDE 3 MG/1
1 TABLET TABLET ORAL
OUTPATIENT

## 2025-02-28 RX ORDER — GABAPENTIN 300 MG/1
1 CAPSULE CAPSULE ORAL ONCE A DAY
Status: ACTIVE | COMMUNITY

## 2025-02-28 RX ORDER — ALBUTEROL SULFATE 2.5 MG/3ML
3 ML AS NEEDED SOLUTION RESPIRATORY (INHALATION)
Status: ACTIVE | COMMUNITY

## 2025-02-28 RX ORDER — TIOTROPIUM BROMIDE INHALATION SPRAY 3.12 UG/1
SPRAY, METERED RESPIRATORY (INHALATION)
Qty: 12 GRAM | Status: ACTIVE | COMMUNITY

## 2025-02-28 RX ORDER — BUPROPION HYDROCHLORIDE 300 MG/1
1 TABLET IN THE MORNING TABLET, EXTENDED RELEASE ORAL ONCE A DAY
Status: ACTIVE | COMMUNITY

## 2025-02-28 RX ORDER — IPRATROPIUM BROMIDE 0.5 MG/2.5ML
SOLUTION RESPIRATORY (INHALATION)
Qty: 300 MILLILITER | Status: ACTIVE | COMMUNITY

## 2025-02-28 RX ORDER — OMEPRAZOLE 40 MG/1
TAKE 1 CAPSULE CAPSULE, DELAYED RELEASE ORAL ONCE A DAY
Qty: 90 | Refills: 1

## 2025-02-28 RX ORDER — FINASTERIDE 1 MG/1
1 TABLET TABLET, FILM COATED ORAL ONCE A DAY
Status: ACTIVE | COMMUNITY

## 2025-02-28 RX ORDER — FLUTICASONE PROPIONATE 93 UG/1
2 SPRAYS (1 SPRAY IN EACH NOSTRIL) SPRAY, METERED NASAL TWICE A DAY
Status: ACTIVE | COMMUNITY

## 2025-02-28 RX ORDER — VORTIOXETINE 20 MG/1
1 TABLET TABLET, FILM COATED ORAL ONCE A DAY
Status: ACTIVE | COMMUNITY

## 2025-02-28 RX ORDER — PLECANATIDE 3 MG/1
1 TABLET TABLET ORAL
Qty: 90 TABLET | Refills: 3 | Status: ACTIVE | COMMUNITY

## 2025-02-28 RX ORDER — BACLOFEN 10 MG/1
1 TABLET AS NEEDED TABLET ORAL TWICE A DAY
Status: ACTIVE | COMMUNITY

## 2025-02-28 RX ORDER — DICYCLOMINE HYDROCHLORIDE 20 MG/1
1 TABLET TABLET ORAL
Qty: 60 | Refills: 2 | Status: ACTIVE | COMMUNITY

## 2025-02-28 RX ORDER — METHYLPHENIDATE 10 MG/1
1 CAPSULE IN THE MORNING CAPSULE, EXTENDED RELEASE ORAL ONCE A DAY
Refills: 0 | Status: ACTIVE | COMMUNITY

## 2025-02-28 RX ORDER — OMEPRAZOLE 40 MG/1
TAKE 1 CAPSULE CAPSULE, DELAYED RELEASE ORAL ONCE A DAY
Qty: 90 | Refills: 1 | Status: ACTIVE | COMMUNITY

## 2025-02-28 NOTE — HPI-TODAY'S VISIT:
Patient contacted via telephone for visit telehealth visit today.  He is currently c/o increasing abdominal pain over the last month.  States father became ill and passed away during this time.  Unsure if this is the etiology of his complaints.  Voices increasing constipation and lower abdominal discomfort.  Associated symptoms includes gas, bloating.  He has been taking additional doses of OTC Gas-X and Gaviscon, as well as prn use of dicyclomine.  Remains on Trulance to manage his constipation with occasional addition of Miralax.  He is currently experiencing a sinus infection as well.

## 2025-02-28 NOTE — HPI-OTHER HISTORIES
- - - - - - - - - - - - - - - - - - - - - - - - - - - - -- Office note 11/18/2024: Presents today for follow up regarding IBS-C. Remains on Trulance 3mg to manage his constipation. Due to c/o abdominal bloating, prescribed Xifaxan 550 mg. Feels constipation increased after use of Xifaxan. Stools are similar to types 2-3 with straining voiced. Admits to spending about 30 minutes on the toilet to experience a bowel movement.  Defecation is occurring every 1-2 days. Experiences fecal seepage in this setting. Admits to 2 rounds of antibiotics for chronic infection, which may be contributing to his c/o seepage of stool. Still voices increase gas pains and abdominal discomfort. Remains on Trulance 3 mg but still fails to experience a complete sense of evacuation of stool.   Admits to increased amount of anxiety and depression over the last several months.  He remains on the care of pyschologist and psychiatrist.

## 2025-04-11 ENCOUNTER — OFFICE VISIT (OUTPATIENT)
Dept: URBAN - METROPOLITAN AREA CLINIC 88 | Facility: CLINIC | Age: 30
End: 2025-04-11
Payer: COMMERCIAL

## 2025-04-11 DIAGNOSIS — K21.9 GASTROESOPHAGEAL REFLUX DISEASE, UNSPECIFIED WHETHER ESOPHAGITIS PRESENT: ICD-10-CM

## 2025-04-11 DIAGNOSIS — K58.1 IRRITABLE BOWEL SYNDROME WITH CONSTIPATION: ICD-10-CM

## 2025-04-11 DIAGNOSIS — R10.84 ABDOMINAL PAIN, GENERALIZED: ICD-10-CM

## 2025-04-11 DIAGNOSIS — R14.0 POSTPRANDIAL BLOATING: ICD-10-CM

## 2025-04-11 PROCEDURE — 99214 OFFICE O/P EST MOD 30 MIN: CPT | Performed by: NURSE PRACTITIONER

## 2025-04-11 RX ORDER — METHYLPHENIDATE 10 MG/1
1 CAPSULE IN THE MORNING CAPSULE, EXTENDED RELEASE ORAL ONCE A DAY
Refills: 0 | Status: ACTIVE | COMMUNITY

## 2025-04-11 RX ORDER — OMEPRAZOLE 40 MG/1
TAKE 1 CAPSULE CAPSULE, DELAYED RELEASE ORAL ONCE A DAY
Qty: 90 | Refills: 1
Start: 2025-04-11

## 2025-04-11 RX ORDER — ALBUTEROL SULFATE 2.5 MG/3ML
3 ML AS NEEDED SOLUTION RESPIRATORY (INHALATION)
Status: ACTIVE | COMMUNITY

## 2025-04-11 RX ORDER — PLECANATIDE 3 MG/1
1 TABLET TABLET ORAL
Qty: 90 TABLET | Refills: 3 | Status: ACTIVE | COMMUNITY

## 2025-04-11 RX ORDER — OMEPRAZOLE 40 MG/1
TAKE 1 CAPSULE CAPSULE, DELAYED RELEASE ORAL ONCE A DAY
Qty: 90 | Refills: 1 | Status: ACTIVE | COMMUNITY

## 2025-04-11 RX ORDER — VORTIOXETINE 20 MG/1
1 TABLET TABLET, FILM COATED ORAL ONCE A DAY
Status: ACTIVE | COMMUNITY

## 2025-04-11 RX ORDER — BACLOFEN 10 MG/1
1 TABLET AS NEEDED TABLET ORAL TWICE A DAY
Status: ACTIVE | COMMUNITY

## 2025-04-11 RX ORDER — IPRATROPIUM BROMIDE 0.5 MG/2.5ML
SOLUTION RESPIRATORY (INHALATION)
Qty: 300 MILLILITER | Status: ACTIVE | COMMUNITY

## 2025-04-11 RX ORDER — FINASTERIDE 1 MG/1
1 TABLET TABLET, FILM COATED ORAL ONCE A DAY
Status: ACTIVE | COMMUNITY

## 2025-04-11 RX ORDER — PLECANATIDE 3 MG/1
1 TABLET TABLET ORAL
OUTPATIENT
Start: 2025-04-11

## 2025-04-11 RX ORDER — FLUTICASONE PROPIONATE 93 UG/1
2 SPRAYS (1 SPRAY IN EACH NOSTRIL) SPRAY, METERED NASAL TWICE A DAY
Status: ACTIVE | COMMUNITY

## 2025-04-11 RX ORDER — PLECANATIDE 3 MG/1
1 TABLET TABLET ORAL
Status: ACTIVE | COMMUNITY

## 2025-04-11 RX ORDER — TIOTROPIUM BROMIDE INHALATION SPRAY 3.12 UG/1
SPRAY, METERED RESPIRATORY (INHALATION)
Qty: 12 GRAM | Status: ACTIVE | COMMUNITY

## 2025-04-11 RX ORDER — GABAPENTIN 300 MG/1
1 CAPSULE CAPSULE ORAL ONCE A DAY
Status: ACTIVE | COMMUNITY

## 2025-04-11 RX ORDER — BUPROPION HYDROCHLORIDE 300 MG/1
1 TABLET IN THE MORNING TABLET, EXTENDED RELEASE ORAL ONCE A DAY
Status: ACTIVE | COMMUNITY

## 2025-04-11 RX ORDER — DICYCLOMINE HYDROCHLORIDE 20 MG/1
1 TABLET TABLET ORAL
Qty: 60 | Refills: 2 | Status: ACTIVE | COMMUNITY

## 2025-04-11 NOTE — HPI-TODAY'S VISIT:
Patient contacted via telephone for telehealth visit.  Currently, voicing abdominal discomfort that varies in location.  C/o upper abd pain that is described as burning sensation.  Other times, pain is in lower abd regions.  Stools vary from formed to "skinny" stools with occasional constipation.  Due to onset of "liquid" stools over the last week, did take a dose of Imodium.   Started minocycline for acne over 2 months ago.    States he is attending a wedding tomorrow and is nervous about seeing family/friends and still dealing with death of his father.  Remains on Trulance daily given hx of constipation.

## 2025-04-11 NOTE — HPI-OTHER HISTORIES
----------------------------------------------------------- Telehealth office note 02/28/2025: Patient contacted via telephone for visit telehealth visit today.  He is currently c/o increasing abdominal pain over the last month.  States father became ill and passed away during this time.  Unsure if this is the etiology of his complaints.  Voices increasing constipation and lower abdominal discomfort.  Associated symptoms includes gas, bloating.  He has been taking additional doses of OTC Gas-X and Gaviscon, as well as prn use of dicyclomine.  Remains on Trulance to manage his constipation with occasional addition of Miralax.  He is currently experiencing a sinus infection as well.

## 2025-04-24 ENCOUNTER — TELEPHONE ENCOUNTER (OUTPATIENT)
Dept: URBAN - METROPOLITAN AREA CLINIC 70 | Facility: CLINIC | Age: 30
End: 2025-04-24

## 2025-04-24 RX ORDER — FAMOTIDINE 40 MG/1
1 TABLET AT BEDTIME TABLET, FILM COATED ORAL
Qty: 90 TABLET | Refills: 0 | OUTPATIENT
Start: 2025-04-25

## 2025-07-11 ENCOUNTER — OFFICE VISIT (OUTPATIENT)
Dept: URBAN - METROPOLITAN AREA CLINIC 88 | Facility: CLINIC | Age: 30
End: 2025-07-11
Payer: COMMERCIAL

## 2025-07-11 ENCOUNTER — OFFICE VISIT (OUTPATIENT)
Dept: URBAN - METROPOLITAN AREA CLINIC 88 | Facility: CLINIC | Age: 30
End: 2025-07-11

## 2025-07-11 ENCOUNTER — LAB OUTSIDE AN ENCOUNTER (OUTPATIENT)
Dept: URBAN - METROPOLITAN AREA CLINIC 88 | Facility: CLINIC | Age: 30
End: 2025-07-11

## 2025-07-11 DIAGNOSIS — K58.1 IRRITABLE BOWEL SYNDROME WITH CONSTIPATION: ICD-10-CM

## 2025-07-11 DIAGNOSIS — R10.84 ABDOMINAL PAIN, GENERALIZED: ICD-10-CM

## 2025-07-11 DIAGNOSIS — K21.9 GASTROESOPHAGEAL REFLUX DISEASE, UNSPECIFIED WHETHER ESOPHAGITIS PRESENT: ICD-10-CM

## 2025-07-11 PROCEDURE — 99212 OFFICE O/P EST SF 10 MIN: CPT | Performed by: NURSE PRACTITIONER

## 2025-07-11 PROCEDURE — 99441 PHONE E/M BY PHYS 5-10 MIN: CPT | Performed by: NURSE PRACTITIONER

## 2025-07-11 RX ORDER — BUPROPION HYDROCHLORIDE 300 MG/1
1 TABLET IN THE MORNING TABLET, EXTENDED RELEASE ORAL ONCE A DAY
Status: ACTIVE | COMMUNITY

## 2025-07-11 RX ORDER — BACLOFEN 10 MG/1
1 TABLET AS NEEDED TABLET ORAL TWICE A DAY
Status: ACTIVE | COMMUNITY

## 2025-07-11 RX ORDER — ALBUTEROL SULFATE 2.5 MG/3ML
3 ML AS NEEDED SOLUTION RESPIRATORY (INHALATION)
Status: ACTIVE | COMMUNITY

## 2025-07-11 RX ORDER — FAMOTIDINE 40 MG/1
1 TABLET AT BEDTIME TABLET, FILM COATED ORAL
Qty: 90 TABLET | Refills: 0 | Status: ACTIVE | COMMUNITY
Start: 2025-04-25

## 2025-07-11 RX ORDER — DICYCLOMINE HYDROCHLORIDE 20 MG/1
1 TABLET TABLET ORAL
Qty: 60 | Refills: 2 | Status: ACTIVE | COMMUNITY

## 2025-07-11 RX ORDER — TIOTROPIUM BROMIDE INHALATION SPRAY 3.12 UG/1
SPRAY, METERED RESPIRATORY (INHALATION)
Qty: 12 GRAM | Status: ACTIVE | COMMUNITY

## 2025-07-11 RX ORDER — IPRATROPIUM BROMIDE 0.5 MG/2.5ML
SOLUTION RESPIRATORY (INHALATION)
Qty: 300 MILLILITER | Status: ACTIVE | COMMUNITY

## 2025-07-11 RX ORDER — VORTIOXETINE 20 MG/1
1 TABLET TABLET, FILM COATED ORAL ONCE A DAY
Status: ACTIVE | COMMUNITY

## 2025-07-11 RX ORDER — GABAPENTIN 300 MG/1
1 CAPSULE CAPSULE ORAL ONCE A DAY
Status: ACTIVE | COMMUNITY

## 2025-07-11 RX ORDER — OMEPRAZOLE 40 MG/1
TAKE 1 CAPSULE CAPSULE, DELAYED RELEASE ORAL ONCE A DAY
Qty: 90 | Refills: 1 | Status: ACTIVE | COMMUNITY
Start: 2025-04-11

## 2025-07-11 RX ORDER — METHYLPHENIDATE 10 MG/1
1 CAPSULE IN THE MORNING CAPSULE, EXTENDED RELEASE ORAL ONCE A DAY
Refills: 0 | Status: ACTIVE | COMMUNITY

## 2025-07-11 RX ORDER — PLECANATIDE 3 MG/1
1 TABLET TABLET ORAL
Status: ACTIVE | COMMUNITY
Start: 2025-04-11

## 2025-07-11 RX ORDER — FLUTICASONE PROPIONATE 93 UG/1
2 SPRAYS (1 SPRAY IN EACH NOSTRIL) SPRAY, METERED NASAL TWICE A DAY
Status: ACTIVE | COMMUNITY

## 2025-07-11 RX ORDER — FINASTERIDE 1 MG/1
1 TABLET TABLET, FILM COATED ORAL ONCE A DAY
Status: ACTIVE | COMMUNITY

## 2025-07-11 RX ORDER — OMEPRAZOLE 40 MG/1
TAKE 1 CAPSULE CAPSULE, DELAYED RELEASE ORAL
Qty: 90 | Refills: 1
Start: 2025-07-11

## 2025-07-11 RX ORDER — PLECANATIDE 3 MG/1
1 TABLET TABLET ORAL
Qty: 90 TABLET | Refills: 3 | Status: ACTIVE | COMMUNITY

## 2025-07-11 RX ORDER — PLECANATIDE 3 MG/1
1 TABLET TABLET ORAL
OUTPATIENT
Start: 2025-07-11

## 2025-07-11 NOTE — HPI-TODAY'S VISIT:
Patient was contacted via telephone for telehealth visit. He has a IBS and GERD.   Continues to voice c/o abd bloating and gas on daily basis.   Defecation occurs on a daily basis, up to 3x/day, with use of Trulance.  Reports occasional hemorrhoid discomfort with intermittent bleeding. Denies bloating improving with defecation. Bloating remains an issue post-prandially.  Continues to adhere to Low FODMAP diet w/o significant changes.

## 2025-07-11 NOTE — HPI-OTHER HISTORIES
----------------------------------------------------------------- Telehealth Appointment 04/11/2025: Patient contacted via telephone for telehealth visit. Currently, voicing abdominal discomfort that varies in location. C/o upper abd pain that is described as burning sensation. Other times, pain is in lower abd regions. Stools vary from formed to "skinny" stools with occasional constipation. Due to onset of "liquid" stools over the last week, did take a dose of Imodium. Started minocycline for acne over 2 months ago.  States he is attending a wedding tomorrow and is nervous about seeing family/friends and still dealing with death of his father. Remains on Trulance daily given hx of constipation.

## 2025-08-04 ENCOUNTER — TELEPHONE ENCOUNTER (OUTPATIENT)
Dept: URBAN - METROPOLITAN AREA CLINIC 70 | Facility: CLINIC | Age: 30
End: 2025-08-04

## 2025-08-04 RX ORDER — METOCLOPRAMIDE 10 MG/1
1 TABLET BEFORE MEALS TABLET ORAL
Qty: 60 | Refills: 1 | OUTPATIENT
Start: 2025-08-04

## 2025-08-11 ENCOUNTER — OFFICE VISIT (OUTPATIENT)
Dept: URBAN - METROPOLITAN AREA CLINIC 88 | Facility: CLINIC | Age: 30
End: 2025-08-11
Payer: COMMERCIAL

## 2025-08-11 DIAGNOSIS — K58.1 IRRITABLE BOWEL SYNDROME WITH CONSTIPATION: ICD-10-CM

## 2025-08-11 DIAGNOSIS — K21.00 GASTROESOPHAGEAL REFLUX DISEASE WITH ESOPHAGITIS WITHOUT HEMORRHAGE: ICD-10-CM

## 2025-08-11 DIAGNOSIS — K31.84 NONDIABETIC GASTROPARESIS: ICD-10-CM

## 2025-08-11 DIAGNOSIS — R14.0 POSTPRANDIAL BLOATING: ICD-10-CM

## 2025-08-11 PROCEDURE — 99214 OFFICE O/P EST MOD 30 MIN: CPT | Performed by: NURSE PRACTITIONER

## 2025-08-11 RX ORDER — DICYCLOMINE HYDROCHLORIDE 20 MG/1
1 TABLET TABLET ORAL
Qty: 60 | Refills: 2 | Status: ACTIVE | COMMUNITY

## 2025-08-11 RX ORDER — IPRATROPIUM BROMIDE 0.5 MG/2.5ML
SOLUTION RESPIRATORY (INHALATION)
Qty: 300 MILLILITER | Status: ACTIVE | COMMUNITY

## 2025-08-11 RX ORDER — GABAPENTIN 300 MG/1
1 CAPSULE CAPSULE ORAL ONCE A DAY
Status: ACTIVE | COMMUNITY

## 2025-08-11 RX ORDER — FLUTICASONE PROPIONATE 93 UG/1
2 SPRAYS (1 SPRAY IN EACH NOSTRIL) SPRAY, METERED NASAL TWICE A DAY
Status: ACTIVE | COMMUNITY

## 2025-08-11 RX ORDER — OMEPRAZOLE 40 MG/1
TAKE 1 CAPSULE CAPSULE, DELAYED RELEASE ORAL ONCE A DAY
Qty: 90 | Refills: 1 | Status: ACTIVE | COMMUNITY
Start: 2025-04-11

## 2025-08-11 RX ORDER — PLECANATIDE 3 MG/1
1 TABLET TABLET ORAL
Qty: 90 TABLET | Refills: 3 | Status: ACTIVE | COMMUNITY

## 2025-08-11 RX ORDER — DICYCLOMINE HYDROCHLORIDE 20 MG/1
1 TABLET TABLET ORAL
OUTPATIENT

## 2025-08-11 RX ORDER — FINASTERIDE 1 MG/1
1 TABLET TABLET, FILM COATED ORAL ONCE A DAY
Status: ACTIVE | COMMUNITY

## 2025-08-11 RX ORDER — METOCLOPRAMIDE 10 MG/1
1 TABLET BEFORE MEALS TABLET ORAL
Qty: 60 | Refills: 1 | Status: ACTIVE | COMMUNITY
Start: 2025-08-04

## 2025-08-11 RX ORDER — FAMOTIDINE 40 MG/1
1 TABLET AT BEDTIME TABLET, FILM COATED ORAL
Qty: 90 TABLET | Refills: 0 | Status: ACTIVE | COMMUNITY
Start: 2025-04-25

## 2025-08-11 RX ORDER — BACLOFEN 10 MG/1
1 TABLET AS NEEDED TABLET ORAL TWICE A DAY
Status: ACTIVE | COMMUNITY

## 2025-08-11 RX ORDER — VORTIOXETINE 20 MG/1
1 TABLET TABLET, FILM COATED ORAL ONCE A DAY
Status: ACTIVE | COMMUNITY

## 2025-08-11 RX ORDER — PLECANATIDE 3 MG/1
1 TABLET TABLET ORAL
Status: ACTIVE | COMMUNITY
Start: 2025-04-11

## 2025-08-11 RX ORDER — OMEPRAZOLE 40 MG/1
TAKE 1 CAPSULE CAPSULE, DELAYED RELEASE ORAL ONCE A DAY
OUTPATIENT
Start: 2025-04-11

## 2025-08-11 RX ORDER — METHYLPHENIDATE 10 MG/1
1 CAPSULE IN THE MORNING CAPSULE, EXTENDED RELEASE ORAL ONCE A DAY
Refills: 0 | Status: ACTIVE | COMMUNITY

## 2025-08-11 RX ORDER — BUPROPION HYDROCHLORIDE 300 MG/1
1 TABLET IN THE MORNING TABLET, EXTENDED RELEASE ORAL ONCE A DAY
Status: ACTIVE | COMMUNITY

## 2025-08-11 RX ORDER — METOCLOPRAMIDE 10 MG/1
1 TABLET BEFORE MEALS TABLET ORAL
Qty: 60 | Refills: 1
Start: 2025-08-04

## 2025-08-11 RX ORDER — TIOTROPIUM BROMIDE INHALATION SPRAY 3.12 UG/1
SPRAY, METERED RESPIRATORY (INHALATION)
Qty: 12 GRAM | Status: ACTIVE | COMMUNITY

## 2025-08-11 RX ORDER — PLECANATIDE 3 MG/1
1 TABLET TABLET ORAL
OUTPATIENT
Start: 2025-08-11

## 2025-08-11 RX ORDER — ALBUTEROL SULFATE 2.5 MG/3ML
3 ML AS NEEDED SOLUTION RESPIRATORY (INHALATION)
Status: ACTIVE | COMMUNITY

## 2025-08-19 ENCOUNTER — OFFICE VISIT (OUTPATIENT)
Dept: URBAN - METROPOLITAN AREA TELEHEALTH 2 | Facility: TELEHEALTH | Age: 30
End: 2025-08-19
Payer: COMMERCIAL

## 2025-08-19 DIAGNOSIS — K31.84 GASTROPARESIS: ICD-10-CM

## 2025-08-19 PROCEDURE — 97802 MEDICAL NUTRITION INDIV IN: CPT | Performed by: DIETITIAN, REGISTERED

## 2025-08-19 RX ORDER — VORTIOXETINE 20 MG/1
1 TABLET TABLET, FILM COATED ORAL ONCE A DAY
Status: ACTIVE | COMMUNITY

## 2025-08-19 RX ORDER — FINASTERIDE 1 MG/1
1 TABLET TABLET, FILM COATED ORAL ONCE A DAY
Status: ACTIVE | COMMUNITY

## 2025-08-19 RX ORDER — BACLOFEN 10 MG/1
1 TABLET AS NEEDED TABLET ORAL TWICE A DAY
Status: ACTIVE | COMMUNITY

## 2025-08-19 RX ORDER — PLECANATIDE 3 MG/1
1 TABLET TABLET ORAL
Status: ACTIVE | COMMUNITY
Start: 2025-08-11

## 2025-08-19 RX ORDER — PLECANATIDE 3 MG/1
1 TABLET TABLET ORAL
Qty: 90 TABLET | Refills: 3 | Status: ACTIVE | COMMUNITY

## 2025-08-19 RX ORDER — OMEPRAZOLE 40 MG/1
TAKE 1 CAPSULE CAPSULE, DELAYED RELEASE ORAL ONCE A DAY
Status: ACTIVE | COMMUNITY
Start: 2025-04-11

## 2025-08-19 RX ORDER — TIOTROPIUM BROMIDE INHALATION SPRAY 3.12 UG/1
SPRAY, METERED RESPIRATORY (INHALATION)
Qty: 12 GRAM | Status: ACTIVE | COMMUNITY

## 2025-08-19 RX ORDER — FLUTICASONE PROPIONATE 93 UG/1
2 SPRAYS (1 SPRAY IN EACH NOSTRIL) SPRAY, METERED NASAL TWICE A DAY
Status: ACTIVE | COMMUNITY

## 2025-08-19 RX ORDER — BUPROPION HYDROCHLORIDE 300 MG/1
1 TABLET IN THE MORNING TABLET, EXTENDED RELEASE ORAL ONCE A DAY
Status: ACTIVE | COMMUNITY

## 2025-08-19 RX ORDER — ALBUTEROL SULFATE 2.5 MG/3ML
3 ML AS NEEDED SOLUTION RESPIRATORY (INHALATION)
Status: ACTIVE | COMMUNITY

## 2025-08-19 RX ORDER — FAMOTIDINE 40 MG/1
1 TABLET AT BEDTIME TABLET, FILM COATED ORAL
Qty: 90 TABLET | Refills: 0 | Status: ACTIVE | COMMUNITY
Start: 2025-04-25

## 2025-08-19 RX ORDER — METOCLOPRAMIDE 10 MG/1
1 TABLET BEFORE MEALS TABLET ORAL
Qty: 60 | Refills: 1 | Status: ACTIVE | COMMUNITY
Start: 2025-08-04

## 2025-08-19 RX ORDER — METHYLPHENIDATE 10 MG/1
1 CAPSULE IN THE MORNING CAPSULE, EXTENDED RELEASE ORAL ONCE A DAY
Refills: 0 | Status: ACTIVE | COMMUNITY

## 2025-08-19 RX ORDER — IPRATROPIUM BROMIDE 0.5 MG/2.5ML
SOLUTION RESPIRATORY (INHALATION)
Qty: 300 MILLILITER | Status: ACTIVE | COMMUNITY

## 2025-08-19 RX ORDER — GABAPENTIN 300 MG/1
1 CAPSULE CAPSULE ORAL ONCE A DAY
Status: ACTIVE | COMMUNITY

## 2025-08-19 RX ORDER — DICYCLOMINE HYDROCHLORIDE 20 MG/1
1 TABLET TABLET ORAL
Status: ACTIVE | COMMUNITY

## 2025-08-21 ENCOUNTER — TELEPHONE ENCOUNTER (OUTPATIENT)
Dept: URBAN - METROPOLITAN AREA CLINIC 70 | Facility: CLINIC | Age: 30
End: 2025-08-21

## 2025-08-21 RX ORDER — FAMOTIDINE 40 MG/1
1 TABLET AT BEDTIME TABLET, FILM COATED ORAL
Qty: 90 TABLET | Refills: 0
Start: 2025-04-25

## 2025-08-22 ENCOUNTER — ERX REFILL RESPONSE (OUTPATIENT)
Dept: URBAN - METROPOLITAN AREA CLINIC 70 | Facility: CLINIC | Age: 30
End: 2025-08-22

## 2025-08-22 RX ORDER — FAMOTIDINE 40 MG/1
TAKE 1 TABLET BY MOUTH EVERY NIGHT AT BEDTIME FOR REFLUX TABLET, FILM COATED ORAL
Qty: 90 TABLET | Refills: 1